# Patient Record
Sex: MALE | Race: BLACK OR AFRICAN AMERICAN | URBAN - METROPOLITAN AREA
[De-identification: names, ages, dates, MRNs, and addresses within clinical notes are randomized per-mention and may not be internally consistent; named-entity substitution may affect disease eponyms.]

---

## 2019-03-08 ENCOUNTER — NEW PATIENT (OUTPATIENT)
Dept: URBAN - METROPOLITAN AREA CLINIC 10 | Facility: CLINIC | Age: 74
End: 2019-03-08
Payer: MEDICARE

## 2019-03-08 DIAGNOSIS — E11.3293 DIABETES MELLITUS TYPE 2 WITH MILD NON-PROLIFERATI: ICD-10-CM

## 2019-03-08 DIAGNOSIS — Z79.84 LONG TERM (CURRENT) USE OF ORAL ANTIDIABETIC DRUGS: ICD-10-CM

## 2019-03-08 PROCEDURE — 92004 COMPRE OPH EXAM NEW PT 1/>: CPT | Performed by: OPTOMETRIST

## 2019-03-08 PROCEDURE — 92134 CPTRZ OPH DX IMG PST SGM RTA: CPT | Performed by: OPTOMETRIST

## 2019-03-08 PROCEDURE — 76514 ECHO EXAM OF EYE THICKNESS: CPT | Performed by: OPTOMETRIST

## 2019-03-08 PROCEDURE — 92133 CPTRZD OPH DX IMG PST SGM ON: CPT | Performed by: OPTOMETRIST

## 2019-03-08 RX ORDER — LATANOPROST 50 UG/ML
0.005 % SOLUTION OPHTHALMIC
Qty: 1 | Refills: 1 | Status: INACTIVE
Start: 2019-03-08 | End: 2019-04-08

## 2019-03-08 ASSESSMENT — INTRAOCULAR PRESSURE
OS: 23
OS: 24
OD: 23
OD: 24

## 2019-03-08 ASSESSMENT — VISUAL ACUITY
OD: 20/25
OS: 20/25

## 2019-03-08 ASSESSMENT — KERATOMETRY
OS: 43.38
OD: 43.38

## 2019-04-08 ENCOUNTER — FOLLOW UP ESTABLISHED (OUTPATIENT)
Dept: URBAN - METROPOLITAN AREA CLINIC 10 | Facility: CLINIC | Age: 74
End: 2019-04-08
Payer: MEDICARE

## 2019-04-08 DIAGNOSIS — H40.1134 PRIMARY OPEN-ANGLE GLAUCOMA, MILD STAGE, BILATERAL: Primary | ICD-10-CM

## 2019-04-08 PROCEDURE — 92083 EXTENDED VISUAL FIELD XM: CPT | Performed by: OPTOMETRIST

## 2019-04-08 PROCEDURE — 92012 INTRM OPH EXAM EST PATIENT: CPT | Performed by: OPTOMETRIST

## 2019-04-08 RX ORDER — LATANOPROST 50 UG/ML
0.005 % SOLUTION OPHTHALMIC
Qty: 1 | Refills: 0 | Status: INACTIVE
Start: 2019-04-08 | End: 2019-06-12

## 2019-04-08 ASSESSMENT — INTRAOCULAR PRESSURE
OD: 14
OS: 16
OD: 18
OS: 18

## 2019-08-28 ENCOUNTER — FOLLOW UP ESTABLISHED (OUTPATIENT)
Dept: URBAN - METROPOLITAN AREA CLINIC 10 | Facility: CLINIC | Age: 74
End: 2019-08-28
Payer: MEDICARE

## 2019-08-28 PROCEDURE — 92012 INTRM OPH EXAM EST PATIENT: CPT | Performed by: OPTOMETRIST

## 2019-08-28 PROCEDURE — 92250 FUNDUS PHOTOGRAPHY W/I&R: CPT | Performed by: OPTOMETRIST

## 2019-08-28 PROCEDURE — 92083 EXTENDED VISUAL FIELD XM: CPT | Performed by: OPTOMETRIST

## 2019-08-28 ASSESSMENT — INTRAOCULAR PRESSURE
OD: 17
OD: 19
OS: 17
OS: 19

## 2019-12-30 ENCOUNTER — FOLLOW UP ESTABLISHED (OUTPATIENT)
Dept: URBAN - METROPOLITAN AREA CLINIC 10 | Facility: CLINIC | Age: 74
End: 2019-12-30
Payer: MEDICARE

## 2019-12-30 PROCEDURE — 92012 INTRM OPH EXAM EST PATIENT: CPT | Performed by: OPTOMETRIST

## 2019-12-30 RX ORDER — LATANOPROST 50 UG/ML
0.005 % SOLUTION OPHTHALMIC
Qty: 1 | Refills: 3 | Status: INACTIVE
Start: 2019-12-30 | End: 2020-12-09

## 2019-12-30 ASSESSMENT — INTRAOCULAR PRESSURE
OD: 25
OS: 23

## 2020-08-12 ENCOUNTER — FOLLOW UP ESTABLISHED (OUTPATIENT)
Dept: URBAN - METROPOLITAN AREA CLINIC 10 | Facility: CLINIC | Age: 75
End: 2020-08-12
Payer: MEDICARE

## 2020-08-12 PROCEDURE — 92250 FUNDUS PHOTOGRAPHY W/I&R: CPT | Performed by: OPTOMETRIST

## 2020-08-12 PROCEDURE — 92014 COMPRE OPH EXAM EST PT 1/>: CPT | Performed by: OPTOMETRIST

## 2020-08-12 ASSESSMENT — KERATOMETRY
OS: 42.88
OD: 43.38

## 2020-08-12 ASSESSMENT — INTRAOCULAR PRESSURE
OS: 19
OD: 18

## 2020-08-12 ASSESSMENT — VISUAL ACUITY
OD: 20/20
OS: 20/20

## 2020-12-09 ENCOUNTER — FOLLOW UP ESTABLISHED (OUTPATIENT)
Dept: URBAN - METROPOLITAN AREA CLINIC 10 | Facility: CLINIC | Age: 75
End: 2020-12-09
Payer: MEDICARE

## 2020-12-09 DIAGNOSIS — H04.123 DRY EYE SYNDROME OF BILATERAL LACRIMAL GLANDS: ICD-10-CM

## 2020-12-09 PROCEDURE — 92083 EXTENDED VISUAL FIELD XM: CPT | Performed by: OPTOMETRIST

## 2020-12-09 PROCEDURE — 92012 INTRM OPH EXAM EST PATIENT: CPT | Performed by: OPTOMETRIST

## 2020-12-09 RX ORDER — LATANOPROST 50 UG/ML
0.005 % SOLUTION OPHTHALMIC
Qty: 2.5 | Refills: 6 | Status: ACTIVE
Start: 2020-12-09

## 2020-12-09 ASSESSMENT — INTRAOCULAR PRESSURE
OD: 16
OS: 17

## 2021-04-20 ENCOUNTER — OFFICE VISIT (OUTPATIENT)
Dept: URBAN - METROPOLITAN AREA CLINIC 10 | Facility: CLINIC | Age: 76
End: 2021-04-20
Payer: MEDICARE

## 2021-04-20 PROCEDURE — 99213 OFFICE O/P EST LOW 20 MIN: CPT | Performed by: OPTOMETRIST

## 2021-04-20 PROCEDURE — 92133 CPTRZD OPH DX IMG PST SGM ON: CPT | Performed by: OPTOMETRIST

## 2021-04-20 ASSESSMENT — INTRAOCULAR PRESSURE
OS: 20
OD: 21
OS: 14
OD: 18

## 2021-04-20 NOTE — IMPRESSION/PLAN
Impression: Combined forms of age-related cataract, bilateral Plan: Significant but pt. continues to prefer to monitor. Cataract evaluation PRN.

## 2021-04-20 NOTE — IMPRESSION/PLAN
Impression: Primary open-angle glaucoma, mild stage, bilateral Plan: IOP sub-optimal OU today. Family hx uncertain Pachys average Last [employer] OCT OD: early superior thinning  OS:  early temporal thinning HVF: inf nasal step OD, overall full OS Cont latanoprost qhs OU. Add Rhopressa qhs OD (sample given, monocular trial). Stressed compliance again today. Showed HVF to pt. RTC 4 weeks for IOP check.

## 2021-05-18 ENCOUNTER — OFFICE VISIT (OUTPATIENT)
Dept: URBAN - METROPOLITAN AREA CLINIC 10 | Facility: CLINIC | Age: 76
End: 2021-05-18
Payer: MEDICARE

## 2021-05-18 PROCEDURE — 99213 OFFICE O/P EST LOW 20 MIN: CPT | Performed by: OPTOMETRIST

## 2021-05-18 RX ORDER — NETARSUDIL 0.2 MG/ML
0.02 % SOLUTION/ DROPS OPHTHALMIC; TOPICAL
Qty: 5 | Refills: 6 | Status: ACTIVE
Start: 2021-05-18

## 2021-05-18 ASSESSMENT — INTRAOCULAR PRESSURE
OS: 18
OD: 19

## 2021-05-18 NOTE — IMPRESSION/PLAN
Impression: Primary open-angle glaucoma, mild stage, bilateral Plan: IOP improved c addition c Rhopressa. Family hx uncertain Pachys average Last [employer] OCT OD: early superior thinning  OS:  early temporal thinning HVF: inf nasal step OD, overall full OS Cont latanoprost qhs OU. Cont. Rhopressa qhs OD. Stressed compliance again today. Showed HVF to pt. RTC 4 months for complete exam c optos.

## 2021-09-15 ENCOUNTER — OFFICE VISIT (OUTPATIENT)
Dept: URBAN - METROPOLITAN AREA CLINIC 10 | Facility: CLINIC | Age: 76
End: 2021-09-15
Payer: MEDICARE

## 2021-09-15 DIAGNOSIS — H25.813 COMBINED FORMS OF AGE-RELATED CATARACT, BILATERAL: ICD-10-CM

## 2021-09-15 PROCEDURE — 92250 FUNDUS PHOTOGRAPHY W/I&R: CPT | Performed by: OPTOMETRIST

## 2021-09-15 PROCEDURE — 99214 OFFICE O/P EST MOD 30 MIN: CPT | Performed by: OPTOMETRIST

## 2021-09-15 ASSESSMENT — VISUAL ACUITY
OS: 20/30
OD: 20/30

## 2021-09-15 ASSESSMENT — INTRAOCULAR PRESSURE
OS: 19
OD: 19

## 2021-09-15 NOTE — IMPRESSION/PLAN
Impression: Combined forms of age-related cataract, bilateral Plan: Discussed cataract diagnosis with the patient. Discussed and reviewed treatment options for cataracts. Risks and benefits of surgical treatment were discussed and understood. Patient elects surgical treatment. Recommend surgery OU, OS first.
--Plan distance aim OU. OK for ORA. Standard IOL. No multifocal.
--Discussed glaucoma may limit BCVA p sx. --Consider MIGS. PT. understands this will not eliminate need for gtts.

## 2021-09-15 NOTE — IMPRESSION/PLAN
Impression: Primary open-angle glaucoma, mild stage, bilateral Plan: IOP appears stable OU. Disc photos reviewed today c Pt. Family hx uncertain Pachys average Last [employer] OCT OD: early superior thinning  OS:  early temporal thinning HVF: inf nasal step OD, overall full OS Cont latanoprost qhs OU. Cont. Rhopressa qhs OD. Stressed compliance again today. RTC 4 months for IOP check c HVF 24-2. Pt. may proceed c cataract sx and consider MIGS.

## 2021-11-15 ENCOUNTER — TESTING ONLY (OUTPATIENT)
Dept: URBAN - METROPOLITAN AREA CLINIC 10 | Facility: CLINIC | Age: 76
End: 2021-11-15
Payer: MEDICARE

## 2021-11-15 DIAGNOSIS — Z01.818 ENCOUNTER FOR OTHER PREPROCEDURAL EXAMINATION: Primary | ICD-10-CM

## 2021-11-15 PROCEDURE — 92083 EXTENDED VISUAL FIELD XM: CPT | Performed by: OPHTHALMOLOGY

## 2021-11-15 PROCEDURE — 99202 OFFICE O/P NEW SF 15 MIN: CPT | Performed by: PHYSICIAN ASSISTANT

## 2021-11-16 ENCOUNTER — PRE-OPERATIVE VISIT (OUTPATIENT)
Dept: URBAN - METROPOLITAN AREA CLINIC 10 | Facility: CLINIC | Age: 76
End: 2021-11-16
Payer: MEDICARE

## 2021-11-16 DIAGNOSIS — Z79.4 LONG TERM (CURRENT) USE OF INSULIN: ICD-10-CM

## 2021-11-16 DIAGNOSIS — E11.9 TYPE 2 DIABETES MELLITUS WITHOUT COMPLICATIONS: ICD-10-CM

## 2021-11-16 DIAGNOSIS — H40.1131 PRIMARY OPEN-ANGLE GLAUCOMA, BILATERAL, MILD STAGE: ICD-10-CM

## 2021-11-16 DIAGNOSIS — H25.811 COMBINED FORMS OF AGE-RELATED CATARACT, RIGHT EYE: ICD-10-CM

## 2021-11-16 PROCEDURE — 92020 GONIOSCOPY: CPT | Performed by: OPHTHALMOLOGY

## 2021-11-16 PROCEDURE — 99204 OFFICE O/P NEW MOD 45 MIN: CPT | Performed by: OPHTHALMOLOGY

## 2021-11-16 PROCEDURE — 92083 EXTENDED VISUAL FIELD XM: CPT | Performed by: OPHTHALMOLOGY

## 2021-11-16 RX ORDER — PREDNISOLONE ACETATE 10 MG/ML
1 % SUSPENSION/ DROPS OPHTHALMIC
Qty: 5 | Refills: 1 | Status: ACTIVE
Start: 2021-11-16

## 2021-11-16 RX ORDER — DICLOFENAC SODIUM 1 MG/ML
0.1 % SOLUTION/ DROPS OPHTHALMIC
Qty: 5 | Refills: 1 | Status: ACTIVE
Start: 2021-11-16

## 2021-11-16 ASSESSMENT — INTRAOCULAR PRESSURE
OS: 24
OD: 24

## 2021-11-16 NOTE — IMPRESSION/PLAN
Impression: Primary open-angle glaucoma, mild stage, bilateral
-Unknown family hx Plan: PLAN: On latanoprost qhs OU, Rhopressa qhs OD , test reviewed, IOP is  HIGHER   and so may proceed with cataract surgery with MIGS  (KDB 1st, Istent at backup) and Discussed glaucoma may limit vision after surgery, may proceed with migs   in hopes of better iop control - understands does not eliminate meds. Discussed possible unmasking of scotoma after surgery. TESTS: Reviewed 11/15/21 Visual Field - OD: Poor-overall full; OS: Poor-nasal depression Discussed Glaucoma diagnosis in detail with patient. Emphasized and explain complaince. poor compliance can lead to Blindness.

## 2021-11-16 NOTE — IMPRESSION/PLAN
Impression: Tear film insufficiency of bilateral lacrimal glands Plan: Cont. ATs. Discussed with patient, understands this may limit vision after surgery.

## 2021-11-16 NOTE — IMPRESSION/PLAN
Impression: Combined forms of age-related cataract, bilateral Plan: (( AIM -0.25 OU: INJECTABLE OU (DEXYCU 1st choice), Epi, possible stretch, NO ORA OU, NO LRI OU: declined amp. MIGS OU  (KDB 1st, Istent as backup) do not tape head, Glaucoma coverage, CORTICAL - POSS TRYPAN, DENSE, vitrec/sulcus iol backup   )) Discussed cataract diagnosis with the patient. Appropriate testing ordered for cataract diagnosis prior to Preop. Risks and benefits of surgical treatment were discussed and understood. Patient desires surgical treatment. Discussed lens options with pt and pt is ok with wearing glasses after surgery. Patient desires surgery to proceed with surgery OU, OS First. Both eyes examined, medically necessary due to impact in activities of daily living. Discussed with pt limitations of MIGS device and it does not replace  Glaucoma eye drops and would have to continue treatment and would still need glasses after surgery. Discussed higher risks with smaller pupil and discussed iris stretch and higher risks of bleeding. Discussed there is a chance of developing capsular haze after surgery, which may be corrected with laser/yag after sx. Discussed loss of accommodation and near [address] will be blurry after cat sx without glasses.  discussed level of vision and if wants to delay surgery, but complains of glare and desires to proceed with surgery

## 2021-11-23 ENCOUNTER — SURGERY (OUTPATIENT)
Dept: URBAN - METROPOLITAN AREA SURGERY 5 | Facility: SURGERY | Age: 76
End: 2021-11-23
Payer: MEDICARE

## 2021-11-24 ENCOUNTER — POST-OPERATIVE VISIT (OUTPATIENT)
Dept: URBAN - METROPOLITAN AREA CLINIC 10 | Facility: CLINIC | Age: 76
End: 2021-11-24
Payer: MEDICARE

## 2021-11-24 PROCEDURE — 99024 POSTOP FOLLOW-UP VISIT: CPT | Performed by: OPTOMETRIST

## 2021-11-24 ASSESSMENT — INTRAOCULAR PRESSURE: OS: 19

## 2021-11-24 NOTE — IMPRESSION/PLAN
Impression: S/P Cataract Extraction/IOL/KDB OS - 1 Day. Presence of intraocular lens  Z96.1.  Excellent post op course   Post operative instructions reviewed - Plan: RTC 1 week PO. --Continue all meds

## 2021-11-24 NOTE — IMPRESSION/PLAN
Impression: S/P Cataract Extraction/IOL/KDB OS - 1 Day. Presence of intraocular lens  Z96.1.  Excellent post op course   Post operative instructions reviewed - Plan: RTC 1 wk PO. --Continue all meds

## 2021-11-30 ENCOUNTER — POST-OPERATIVE VISIT (OUTPATIENT)
Dept: URBAN - METROPOLITAN AREA CLINIC 10 | Facility: CLINIC | Age: 76
End: 2021-11-30

## 2021-11-30 PROCEDURE — 99024 POSTOP FOLLOW-UP VISIT: CPT | Performed by: OPTOMETRIST

## 2021-11-30 ASSESSMENT — VISUAL ACUITY
OS: 20/20
OD: 20/20

## 2021-11-30 ASSESSMENT — INTRAOCULAR PRESSURE
OD: 20
OS: 21

## 2021-11-30 NOTE — IMPRESSION/PLAN
Impression: S/P Cataract Extraction/IOL/KDB OS - 7 Days. Presence of intraocular lens  Z96.1. Excellent post op course   Post operative instructions reviewed - Plan: Proceed c 2nd eye sx.  --Continue all meds

## 2021-12-07 ENCOUNTER — SURGERY (OUTPATIENT)
Dept: URBAN - METROPOLITAN AREA SURGERY 5 | Facility: SURGERY | Age: 76
End: 2021-12-07
Payer: MEDICARE

## 2021-12-08 ENCOUNTER — POST-OPERATIVE VISIT (OUTPATIENT)
Dept: URBAN - METROPOLITAN AREA CLINIC 10 | Facility: CLINIC | Age: 76
End: 2021-12-08
Payer: MEDICARE

## 2021-12-08 DIAGNOSIS — Z48.810 ENCOUNTER FOR SURGICAL AFTERCARE FOLLOWING SURGERY ON A SENSE ORGAN: Primary | ICD-10-CM

## 2021-12-08 DIAGNOSIS — Z96.1 PRESENCE OF INTRAOCULAR LENS: ICD-10-CM

## 2021-12-08 PROCEDURE — 99024 POSTOP FOLLOW-UP VISIT: CPT | Performed by: OPTOMETRIST

## 2021-12-08 ASSESSMENT — INTRAOCULAR PRESSURE: OD: 16

## 2021-12-08 NOTE — IMPRESSION/PLAN
Impression: S/P Cataract Extraction/IOL/KDB OD - 1 Day. Encounter for surgical aftercare following surgery on a sense organ  Z48.810. Plan: Trimoxi, no gtts. Cont. latanoprost qhs OU and Rhopressa qhs OD. 
 RTC as scheduled c MRx and IOP check

## 2021-12-29 ENCOUNTER — POST-OPERATIVE VISIT (OUTPATIENT)
Dept: URBAN - METROPOLITAN AREA CLINIC 10 | Facility: CLINIC | Age: 76
End: 2021-12-29
Payer: MEDICARE

## 2021-12-29 PROCEDURE — 99024 POSTOP FOLLOW-UP VISIT: CPT | Performed by: OPTOMETRIST

## 2021-12-29 ASSESSMENT — VISUAL ACUITY
OS: 20/20
OD: 20/20

## 2021-12-29 ASSESSMENT — INTRAOCULAR PRESSURE
OS: 12
OD: 16

## 2021-12-29 NOTE — IMPRESSION/PLAN
Impression: S/P Cataract Extraction/IOL/KDB OD - 22 Days. Presence of intraocular lens  Z96.1.  Excellent post op course Plan: RTC 6 mo CEE --Continue all meds